# Patient Record
Sex: FEMALE | Race: WHITE | Employment: FULL TIME | URBAN - METROPOLITAN AREA
[De-identification: names, ages, dates, MRNs, and addresses within clinical notes are randomized per-mention and may not be internally consistent; named-entity substitution may affect disease eponyms.]

---

## 2017-10-25 ENCOUNTER — THERAPY VISIT (OUTPATIENT)
Dept: CHIROPRACTIC MEDICINE | Facility: CLINIC | Age: 62
End: 2017-10-25
Payer: COMMERCIAL

## 2017-10-25 DIAGNOSIS — M25.551 HIP PAIN, RIGHT: ICD-10-CM

## 2017-10-25 DIAGNOSIS — M99.05 SOMATIC DYSFUNCTION OF PELVIS REGION: Primary | ICD-10-CM

## 2017-10-25 DIAGNOSIS — M79.10 MYALGIA: ICD-10-CM

## 2017-10-25 DIAGNOSIS — M16.10 ARTHRITIS OF HIP: ICD-10-CM

## 2017-10-25 PROCEDURE — 98940 CHIROPRACT MANJ 1-2 REGIONS: CPT | Mod: AT | Performed by: CHIROPRACTOR

## 2017-10-25 PROCEDURE — 99212 OFFICE O/P EST SF 10 MIN: CPT | Mod: 25 | Performed by: CHIROPRACTOR

## 2017-10-25 PROCEDURE — 97110 THERAPEUTIC EXERCISES: CPT | Performed by: CHIROPRACTOR

## 2017-10-25 ASSESSMENT — ACTIVITIES OF DAILY LIVING (ADL)
GETTING_INTO_AND_OUT_OF_A_BATHTUB: NO DIFFICULTY AT ALL
GOING_UP_1_FLIGHT_OF_STAIRS: NO DIFFICULTY AT ALL
TWISTING/PIVOTING_ON_INVOLVED_LEG: MODERATE DIFFICULTY
WALKING_APPROXIMATELY_10_MINUTES: NO DIFFICULTY AT ALL
WALKING_DOWN_STEEP_HILLS: NO DIFFICULTY AT ALL
HOW_WOULD_YOU_RATE_YOUR_CURRENT_LEVEL_OF_FUNCTION_DURING_YOUR_USUAL_ACTIVITIES_OF_DAILY_LIVING_FROM_0_TO_100_WITH_100_BEING_YOUR_LEVEL_OF_FUNCTION_PRIOR_TO_YOUR_HIP_PROBLEM_AND_0_BEING_THE_INABILITY_TO_PERFORM_ANY_OF_YOUR_USUAL_DAILY_ACTIVITIES?: 100
STEPPING_UP_AND_DOWN_CURBS: NO DIFFICULTY AT ALL
GETTING_INTO_AND_OUT_OF_AN_AVERAGE_CAR: NO DIFFICULTY AT ALL
DEEP_SQUATTING: NO DIFFICULTY AT ALL
WALKING_15_MINUTES_OR_GREATER: NO DIFFICULTY AT ALL
GOING_DOWN_1_FLIGHT_OF_STAIRS: NO DIFFICULTY AT ALL
PUTTING_ON_SOCKS_AND_SHOES: NO DIFFICULTY AT ALL
LIGHT_TO_MODERATE_WORK: NO DIFFICULTY AT ALL
HOS_ADL_SCORE(%): 96.43
WALKING_INITIALLY: NO DIFFICULTY AT ALL
STANDING_FOR_15_MINUTES: NO DIFFICULTY AT ALL
WALKING_UP_STEEP_HILLS: NO DIFFICULTY AT ALL
SITTING_FOR_15_MINUTES: NO DIFFICULTY AT ALL
HOS_ADL_HIGHEST_POTENTIAL_SCORE: 56
HOS_ADL_COUNT: 14
HOS_ADL_ITEM_SCORE_TOTAL: 54

## 2017-10-25 NOTE — PROGRESS NOTES
Subjective:  Referral from Dr. Abigail MD  CC: sore right hip (from muscle tear)  Location: right anterior hip  Medications: Thyroid Medication and anti anxiety medication   Visit: 1 (re-exam)  Onset: chronic pain for years   Goal: Be able to start being active and walk with no pain  Pain: 3/10 on average, varies with activty  Previous: right hip pain in past responded well to care  Social: alert, oriented, and active. Works in education   Under Care: saw  Dr. Hayes a couple a weeks ago  Imaging: Dexa Scan, bone spur, arthritis      Objective:  Inspection:  No SDD  No Scars     Palpation:  No specific pain  Palpable soreness over R anterior hip   Myofascitis 2/4 noted over r hip ER, R psoas, B glute me      ROM:  Lumbar flexion   90/90, tightness noted over bilateral hamstring and left Ql  Lumbar extension  20/30, discomfort left lower back  Right Hip IR  20/45  Left Hip IR  27/45  Right Hip ER  30/60  Left  hip ER  37/60     MMT:  Left glute med 4/5 with posterior hip discomfort   Right glute med 5/5 with left posterior hip discomfort   Left TFL 5/5 with lateral hip discomfort   Right TFL 5/5 with no pain  Left piriformis 5/5 with no pain  Right piriformis 4/5 with no pain  Psoas 4/5 with pain on right     MET:  Left short leg     Squat:  Shift to right  Right knee genu valgum  Right foot flare  Arm drop on left     Lunge:  Right knee genu valgum  Right knee cross over      NAL:  Restricted sacrum on left     Assessment:  NAL with associated myofascitis and weakness  Lumbago  Hip pain     Plan:  Patient tolerated treatment well today  Treatment Time: 45 minutes  32745 manipulation 1-2 segments: MET, Hip LAD  54482 manipulation:   44586 Manual therapy: (ART, Graston, Strain Counter Strain, Fascial Manipulation, Cupping) performed over area of R psoas, R hip ER's, R adductors, LPS  88627 therapeutic exercise (20 minutes):   1. Hip ER stretching  2. TFL stretching  3. Reviewed level 1 bridges which was given to her  by previous PT  4. Lateral band walks  5. Reverse Lunge (psoas stretch)  6. Adductor self MFR  7. Side lying abduction with hip IR  8. figure 4 self release with foam roller  9. figure 4 glute activation  10. figure 4 bridges on stability ball  11. Monster walks  Today: reverse clams yellow band, bird dogs   Strapping: Hip inferior glides, lateral glide   Next visit: 1 week  Other: Today focused on right hip anterior capsule and hip extension.   Plan: 6 visits over 6 weeks, responded well to care in past.

## 2017-10-25 NOTE — PROGRESS NOTES
Subjective:  Referral from Dr. Abigail MD  CC: sore right hip (from muscle tear)  Location: right anterior hip  Medications: Thyroid Medication and anti anxiety medication   Visit: 1 (re-exam)  Onset: chronic pain for years   Goal: Be able to start being active and walk with no pain  Location: right hip   Pain: 3/10 on average, varies with activty  Previous: right hip pain in past responded well to care  Social: alert, oriented, and active. Works in education   Under Care: saw  Dr. Hayes a couple a weeks ago  Imaging: Dexa Scan, bone spur, arthritis      Objective:  Inspection:  No SDD  No Scars     Palpation:  No specific pain  Palpable soreness over left lateral and posterior hip  Myofascitis 2/4 noted over r hip ER, left hip IR, left glute med, right hamstring, left QL, left TFL , dorsal sacral ligament     ROM:  Lumbar flexion   90/90, tightness noted over bilateral hamstring and left Ql  Lumbar extension  20/30, discomfort left lower back  Right Hip IR  21/45  Left Hip IR  24/45  Right Hip ER  32/60  Left  hip ER  27/60     MMT:  Left glute med 4/5 with posterior hip discomfort   Right glute med 5/5 with left posterior hip discomfort   Left TFL 5/5 with lateral hip discomfort   Right TFL 5/5 with no pain  Left piriformis 4/5 with posterior hip discomfort  Right piriformis 5/5 with no pain     MET:  Left short leg     Squat:  Shift to right  Right knee genu valgum  Right foot flare  Arm drop on left     Lunge:  Right knee genu valgum  Right knee cross over      NAL:  Restricted sacrum on left     Assessment:  NAL with associated myofascitis and weakness  Lumbago  Hip pain     Plan:  Patient tolerated treatment well today  Treatment Time: 45 minutes  16469 manipulation 1-2 segments: MET, Hip LAD  68818 manipulation:   67605 Manual therapy: (ART, Graston, Strain Counter Strain, Fascial Manipulation, Cupping) performed over area of left glute med, left tfl, left adductors, left QL, right hip ER, dorsal sacral  ligament  66767 therapeutic exercise (30 minutes):   1. Hip ER stretching  2. TFL stretching  3. Reviewed level 1 bridges which was given to her by previous PT  4. Lateral band walks  5. Reverse Lunge (psoas stretch)  6. Adductor self MFR  7. Side lying abduction with hip IR  8. figure 4 self release with foam roller  9. figure 4 glute activation  10. figure 4 bridges on stability ball  Today: Monster Walks with band, TFL stretches (side bending)  Strapping: Hip ER on right  Next visit: 1 week  Other: Today focused on right hip anterior capsule and hip extension. She tolerated treatment well.  Plan: 6 visits over 6 weeks, responded well to care in past.

## 2017-11-03 ENCOUNTER — THERAPY VISIT (OUTPATIENT)
Dept: CHIROPRACTIC MEDICINE | Facility: CLINIC | Age: 62
End: 2017-11-03
Payer: COMMERCIAL

## 2017-11-03 DIAGNOSIS — M16.10 ARTHRITIS OF HIP: ICD-10-CM

## 2017-11-03 DIAGNOSIS — M79.10 MYALGIA: ICD-10-CM

## 2017-11-03 DIAGNOSIS — M25.551 HIP PAIN, RIGHT: ICD-10-CM

## 2017-11-03 DIAGNOSIS — M99.05 SOMATIC DYSFUNCTION OF PELVIS REGION: Primary | ICD-10-CM

## 2017-11-03 PROCEDURE — 98940 CHIROPRACT MANJ 1-2 REGIONS: CPT | Mod: AT | Performed by: CHIROPRACTOR

## 2017-11-03 PROCEDURE — 97110 THERAPEUTIC EXERCISES: CPT | Performed by: CHIROPRACTOR

## 2017-11-03 NOTE — MR AVS SNAPSHOT
After Visit Summary   11/3/2017    Cherri Ding    MRN: [id number]           Patient Information     Date Of Birth          1955        Visit Information        Provider Department      11/3/2017 4:00 PM Tanner Snider DC [facility] [place] Chirop[facility]        Today's Diagnoses     Somatic dysfunction of pelvis region    -  1    Hip pain, right        Myalgia        Arthritis of hip           Follow-ups after your visit        Your next 10 appointments already scheduled     Nov 20, 2017  4:00 PM CST   KG Chiropractor with Tanner Snider DC   [facility] [place] Chiropractor (KG [place])    [address]  [id number]  [place] [address] [phone]            Nov 27, 2017   Procedure with Maame St MD   [facility] ([facility])    [address] [place] [address] [phone]           [facility] is located at [address] [place]              Who to contact     If you have questions or need follow up information about today's clinic visit or your schedule please contact [facility] [place] [facility] directly at [phone].  Normal or non-critical lab and imaging results will be communicated to you by [employer], letter or phone within 4 business days after the clinic has received the results. If you do not hear from us within 7 days, please contact the clinic through MyChart or phone. If you have a critical or abnormal lab result, we will notify you by phone as soon as possible.  Submit refill requests through [employer] or call your pharmacy and they will forward the refill request to us. Please allow 3 business days for your refill to be completed.          Additional Information About Your Visit        [employer] Information     [employer] lets you send messages to your doctor, view your test results, renew your  "prescriptions, schedule appointments and more. To sign up, go to [email or web address] . Click on \"Log in\" on the left side of the screen, which will take you to the Welcome page. Then click on \"Sign up Now\" on the right side of the page.     You will be asked to enter the access code listed below, as well as some personal information. Please follow the directions to create your username and password.     Your access code is: [email or web address]  Expires: 2018  9:32 PM     Your access code will  in 90 days. If you need help or a new code, please call your [facility] clinic or [phone].        Care EveryWhere ID     This is your Care EveryWhere ID. This could be used by other organizations to access your [facility] medical records  [id number]         Blood Pressure from Last 3 Encounters:   12 130/59    Weight from Last 3 Encounters:   14 74.8 kg (165 lb)   12 74.8 kg (165 lb)              We Performed the Following     CHIROPRAC MANIP,SPINAL,1-2 REGIONS     THERAPEUTIC EXERCISES        Primary Care Provider Office Phone # Fax #    Elayne [facility] [phone]       [address] [address]        Equal Access to Services     [facility] LORETO : Hadii aad ku hadasho Soomaali, waaxda luqadaha, qaybta kaalmada adeegyada, waxay louin hayaan nona etienne . So [facility] [phone].    ATENCIÓN: Si habla español, tiene a palencia disposición servicios gratuitos de asistencia lingüística. Llame al [phone].    We comply with applicable federal civil rights laws and [place] laws. We do not discriminate on the basis of race, color, national origin, age, disability, sex, sexual orientation, or gender identity.            Thank you!     Thank you for choosing [facility]  for your care. Our goal is always to provide you with excellent care. Hearing back from our patients is one way we can continue to improve our " services. Please take a few minutes to complete the written survey that you may receive in the mail after your visit with us. Thank you!             Your Updated Medication List - Protect others around you: Learn how to safely use, store and throw away your medicines at www.disposemymeds.org.          This list is accurate as of: 11/3/17  9:32 PM.  Always use your most recent med list.                   Brand Name Dispense Instructions for use Diagnosis    LEVOTHROID PO      Take  by mouth.        LEXAPRO PO      Take  by mouth.

## 2017-11-04 NOTE — PROGRESS NOTES
Subjective:  Referral from Dr. Abigail MD  CC: sore right hip (from muscle tear)  Location: right anterior hip  Medications: Thyroid Medication and anti anxiety medication   Visit: 2  Onset: chronic pain for years   Goal: Be able to start being active and walk with no pain  Pain: 3/10 on average, varies with activty  Previous: right hip pain in past responded well to care  Social: alert, oriented, and active. Works in education   Under Care: saw  Dr. Hayes a couple a weeks ago  Imaging: Dexa Scan, bone spur, arthritis     Comes in today doing very well. Tolerated last session well and was pleased with progress. Still has some right anterior hip discomfort. She is working on active care program. Denies any new changes.      Objective:  Inspection:  No SDD  No Scars     Palpation:  No specific pain  Palpable soreness over R anterior hip   Myofascitis 2/4 noted over r hip ER, R psoas, B glute me      ROM:  Lumbar flexion   90/90, tightness noted over bilateral hamstring and left Ql  Lumbar extension  20/30, discomfort left lower back  Right Hip IR  20/45  Left Hip IR  27/45  Right Hip ER  30/60  Left  hip ER  37/60     MMT:  Left glute med 4/5 with posterior hip discomfort   Right glute med 5/5 with left posterior hip discomfort   Left TFL 5/5 with lateral hip discomfort   Right TFL 5/5 with no pain  Left piriformis 5/5 with no pain  Right piriformis 4/5 with no pain  Psoas 4/5 with pain on right     MET:  Left short leg     Squat:  Shift to right  Right knee genu valgum  Right foot flare  Arm drop on left     Lunge:  Right knee genu valgum  Right knee cross over      NAL:  Restricted sacrum on left     Assessment:  NAL with associated myofascitis and weakness  Lumbago  Hip pain     Plan:  Patient tolerated treatment well today  Treatment Time: 45 minutes  69875 manipulation 1-2 segments: MET, Hip LAD  40695 manipulation:   03503 Manual therapy: (ART, Graston, Strain Counter Strain, Fascial Manipulation, Cupping)  performed over area of R psoas, R hip ER's, R adductors, LPS  84101 therapeutic exercise (20 minutes):   1. Hip ER stretching  2. TFL stretching  3. Reviewed level 1 bridges which was given to her by previous PT  4. Lateral band walks  5. Reverse Lunge (psoas stretch)  6. Adductor self MFR  7. Side lying abduction with hip IR  8. figure 4 self release with foam roller  9. figure 4 glute activation  10. figure 4 bridges on stability ball  11. Monster walks  Today: reverse clams yellow band, bird dogs, side lying abduction, marching bridges   Strapping: Hip inferior glides, lateral glide   Next visit: 1 week  Other: Today focused on right hip anterior capsule and hip extension.   Plan: 6 visits over 6 weeks, responded well to care in past.

## 2017-11-13 ENCOUNTER — THERAPY VISIT (OUTPATIENT)
Dept: CHIROPRACTIC MEDICINE | Facility: CLINIC | Age: 62
End: 2017-11-13
Payer: COMMERCIAL

## 2017-11-13 DIAGNOSIS — M16.10 ARTHRITIS OF HIP: ICD-10-CM

## 2017-11-13 DIAGNOSIS — M25.551 HIP PAIN, RIGHT: ICD-10-CM

## 2017-11-13 DIAGNOSIS — M99.05 SOMATIC DYSFUNCTION OF PELVIS REGION: Primary | ICD-10-CM

## 2017-11-13 DIAGNOSIS — M79.10 MYALGIA: ICD-10-CM

## 2017-11-13 PROCEDURE — 97110 THERAPEUTIC EXERCISES: CPT | Performed by: CHIROPRACTOR

## 2017-11-13 PROCEDURE — 98940 CHIROPRACT MANJ 1-2 REGIONS: CPT | Mod: AT | Performed by: CHIROPRACTOR

## 2017-11-13 NOTE — MR AVS SNAPSHOT
After Visit Summary   11/13/2017    Cherri Ding    MRN: [id number]           Patient Information     Date Of Birth          1955        Visit Information        Provider Department      11/13/2017 4:45 PM Tanner Snider DC [facility] [place] Chiropractor        Today's Diagnoses     Somatic dysfunction of pelvis region    -  1    Hip pain, right        Myalgia        Arthritis of hip           Follow-ups after your visit        Your next 10 appointments already scheduled     Nov 20, 2017  4:00 PM CST   KG Chiropractor with Tanner Snider DC   [facility] [place] Chirop[facility] ([facility] [place])    [address] [place] [address]  [address]  [place] [address] [phone]            Nov 27, 2017   Procedure with Maame St MD   [facility] ([facility])    [address]  [facility] [id number] [phone]           [facility] is located at [address] [place]            Nov 28, 2017  3:15 PM CST   KG Chiropractor with Tanner Snider DC   [facility] [place] Chirop[facility] ([facility] [place])    [address] [place] [address]  [address]  [place] [id number] [phone]              Who to contact     If you have questions or need follow up information about today's clinic visit or your schedule please contact [facility] [place] [facility] directly at [phone].  Normal or non-critical lab and imaging results will be communicated to you by MyChart, letter or phone within 4 business days after the clinic has received the results. If you do not hear from us within 7 days, please contact the clinic through MyChart or phone. If you have a critical or abnormal lab result, we will notify you by phone as soon as possible.  Submit refill requests through [employer] or call your pharmacy and they will forward the refill request  "to us. Please allow 3 business days for your refill to be completed.          Additional Information About Your Visit        [employer] Information     [facility] lets you send messages to your doctor, view your test results, renew your prescriptions, schedule appointments and more. To sign up, go to [email or web address] . Click on \"Log in\" on the left side of the screen, which will take you to the Welcome page. Then click on \"Sign up Now\" on the right side of the page.     You will be asked to enter the access code listed below, as well as some personal information. Please follow the directions to create your username and password.     Your access code is: [email or web address]  Expires: 2018  8:32 PM     Your access code will  in 90 days. If you need help or a new code, please call your [facility] clinic or [phone].        Care EveryWhere ID     This is your Care EveryWhere ID. This could be used by other organizations to access your [facility] medical records  [id number]         Blood Pressure from Last 3 Encounters:   12 130/59    Weight from Last 3 Encounters:   14 74.8 kg (165 lb)   12 74.8 kg (165 lb)              We Performed the Following     CHIROPRAC MANIP,SPINAL,1-2 REGIONS     THERAPEUTIC EXERCISES        Primary Care Provider Office Phone # Fax #    Elayne Emmanuel [phone]       [address] [address]        Equal Access to Services     TIERNEY DANGELO : Hadii aad ku hadasho Soomaali, waaxda luqadaha, qaybta kaalmada adeegyada, genna etienne . So [facility] [phone].    ATENCIÓN: Si tatianala radha, tiene a palencia disposición servicios gratuitos de asistencia lingüística. Llame al [phone].    We comply with applicable federal civil rights laws and [place] laws. We do not discriminate on the basis of race, color, national origin, age, disability, sex, sexual orientation, or gender identity.            Thank you!     " Thank you for choosing [facility]  for your care. Our goal is always to provide you with excellent care. Hearing back from our patients is one way we can continue to improve our services. Please take a few minutes to complete the written survey that you may receive in the mail after your visit with us. Thank you!             Your Updated Medication List - Protect others around you: Learn how to safely use, store and throw away your medicines at www.disposemymeds.org.          This list is accurate as of: 11/13/17  8:05 PM.  Always use your most recent med list.                   Brand Name Dispense Instructions for use Diagnosis    LEVOTHROID PO      Take  by mouth.        LEXAPRO PO      Take  by mouth.

## 2017-11-14 NOTE — PROGRESS NOTES
Subjective:  Referral from Dr. 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 Hayes a couple a weeks ago  Imaging: Dexa Scan, bone spur, arthritis     Comes in today doing good. Notes that had tightness after run last week. Notes that has overall noticed an improvement and is pleased with progress. Denies any new issues.      Objective:  Inspection:  No SDD  No Scars     Palpation:  No specific pain  Palpable soreness over R anterior hip   Myofascitis 2/4 noted over r hip ER, R psoas, B glute me      ROM:  Lumbar flexion   90/90, tightness noted over bilateral hamstring and left Ql  Lumbar extension  20/30, discomfort left lower back  Right Hip IR  20/45  Left Hip IR  27/45  Right Hip ER  30/60  Left  hip ER  37/60     MMT:  Left glute med 4/5 with posterior hip discomfort   Right glute med 5/5 with left posterior hip discomfort   Left TFL 5/5 with lateral hip discomfort   Right TFL 5/5 with no pain  Left piriformis 5/5 with no pain  Right piriformis 4/5 with no pain  Psoas 4/5 with pain on right     MET:  Left short leg     Squat:  Shift to right  Right knee genu valgum  Right foot flare  Arm drop on left     Lunge:  Right knee genu valgum  Right knee cross over      NAL:  Restricted sacrum on left     Assessment:  NAL with associated myofascitis and weakness  Lumbago  Hip pain     Plan:  Patient tolerated treatment well today  Treatment Time: 45 minutes  62750 manipulation 1-2 segments: MET, Hip LAD  62926 manipulation:   47140 Manual therapy: (ART, Graston, Strain Counter Strain, Fascial Manipulation, Cupping) performed over area of R psoas,  R hip ER's, R adductors, LPS  41126 therapeutic exercise (20 minutes):   1. Hip ER stretching  2. TFL stretching  3. Reviewed level 1 bridges which was given to her by previous PT  4. Lateral band walks  5. Reverse Lunge (psoas stretch)  6. Adductor self MFR  7. Side lying abduction with hip IR  8. figure 4 self release with foam roller  9. figure 4 glute activation  10. figure 4 bridges on stability ball  11. Monster walks  Today: reverse clams yellow band, bird dogs, side lying abduction, single leg bridges   Strapping: Hip inferior glides, lateral glide   Next visit: 1 week  Other: Today focused on right hip anterior capsule and hip extension.   Plan: 6 visits over 6 weeks, responded well to care in past.

## 2017-11-25 RX ORDER — MULTIPLE VITAMINS W/ MINERALS TAB 9MG-400MCG
1 TAB ORAL DAILY
COMMUNITY

## 2017-11-27 ENCOUNTER — HOSPITAL ENCOUNTER (OUTPATIENT)
Facility: CLINIC | Age: 62
Discharge: HOME | End: 2017-11-27
Attending: COLON & RECTAL SURGERY | Admitting: COLON & RECTAL SURGERY
Payer: COMMERCIAL

## 2017-11-27 VITALS
HEIGHT: 70 IN | OXYGEN SATURATION: 98 % | RESPIRATION RATE: 16 BRPM | BODY MASS INDEX: 22.76 KG/M2 | WEIGHT: 159 LBS | DIASTOLIC BLOOD PRESSURE: 46 MMHG | SYSTOLIC BLOOD PRESSURE: 114 MMHG

## 2017-11-27 LAB — COLONOSCOPY: NORMAL

## 2017-11-27 PROCEDURE — 99153 MOD SED SAME PHYS/QHP EA: CPT

## 2017-11-27 PROCEDURE — G0500 MOD SEDAT ENDO SERVICE >5YRS: HCPCS | Performed by: COLON & RECTAL SURGERY

## 2017-11-27 PROCEDURE — 25000128 H RX IP 250 OP 636: Performed by: COLON & RECTAL SURGERY

## 2017-11-27 PROCEDURE — 88305 TISSUE EXAM BY PATHOLOGIST: CPT | Performed by: COLON & RECTAL SURGERY

## 2017-11-27 PROCEDURE — 45385 COLONOSCOPY W/LESION REMOVAL: CPT | Performed by: COLON & RECTAL SURGERY

## 2017-11-27 PROCEDURE — 88305 TISSUE EXAM BY PATHOLOGIST: CPT | Mod: 26 | Performed by: COLON & RECTAL SURGERY

## 2017-11-27 RX ORDER — FENTANYL CITRATE 50 UG/ML
INJECTION, SOLUTION INTRAMUSCULAR; INTRAVENOUS PRN
Status: DISCONTINUED | OUTPATIENT
Start: 2017-11-27 | End: 2017-11-27 | Stop reason: HOSPADM

## 2017-11-27 RX ORDER — ONDANSETRON 4 MG/1
4 TABLET, ORALLY DISINTEGRATING ORAL EVERY 6 HOURS PRN
Status: DISCONTINUED | OUTPATIENT
Start: 2017-11-27 | End: 2017-11-27 | Stop reason: HOSPADM

## 2017-11-27 RX ORDER — ONDANSETRON 2 MG/ML
4 INJECTION INTRAMUSCULAR; INTRAVENOUS
Status: DISCONTINUED | OUTPATIENT
Start: 2017-11-27 | End: 2017-11-27 | Stop reason: HOSPADM

## 2017-11-27 RX ORDER — FLUMAZENIL 0.1 MG/ML
0.2 INJECTION, SOLUTION INTRAVENOUS
Status: DISCONTINUED | OUTPATIENT
Start: 2017-11-27 | End: 2017-11-27 | Stop reason: HOSPADM

## 2017-11-27 RX ORDER — NALOXONE HYDROCHLORIDE 0.4 MG/ML
.1-.4 INJECTION, SOLUTION INTRAMUSCULAR; INTRAVENOUS; SUBCUTANEOUS
Status: DISCONTINUED | OUTPATIENT
Start: 2017-11-27 | End: 2017-11-27 | Stop reason: HOSPADM

## 2017-11-27 RX ORDER — ONDANSETRON 2 MG/ML
4 INJECTION INTRAMUSCULAR; INTRAVENOUS EVERY 6 HOURS PRN
Status: DISCONTINUED | OUTPATIENT
Start: 2017-11-27 | End: 2017-11-27 | Stop reason: HOSPADM

## 2017-11-27 RX ORDER — LIDOCAINE 40 MG/G
CREAM TOPICAL
Status: DISCONTINUED | OUTPATIENT
Start: 2017-11-27 | End: 2017-11-27 | Stop reason: HOSPADM

## 2017-11-27 NOTE — IP AVS SNAPSHOT
MRN:[id number]                      After Visit Summary   11/27/2017    Cherri Ding    MRN: [id number]           Thank you!     Thank you for choosing [facility] for your care. Our goal is always to provide you with excellent care. Hearing back from our patients is one way we can continue to improve our services. Please take a few minutes to complete the written survey that you may receive in the mail after you visit. If you would like to speak to someone directly about your visit please contact Patient Relations at [phone]. Thank you!          Patient Information     Date Of Birth          1955        About your hospital stay     You were admitted on:  November 27, 2017 You last received care in the:  [facility] Endoscopy    You were discharged on:  November 27, 2017       Who to Call     For medical emergencies, please call 911.  For non-urgent questions about your medical care, please call your primary care provider or clinic, [phone]  For questions related to your surgery, please call your surgery clinic        Attending Provider     Provider Specialty    Maame St MD Colon and Rectal Surgery       Primary Care Provider Office Phone # Fax #    Elayne Emmanuel [phone]      Your next 10 appointments already scheduled     Nov 28, 2017  3:15 PM CST   [facility] Chiropractor with Tanner Snider DC   [facility] [place] [facility] ([facility] [place])    [address] [place] [address]  [address]  [place] [address] [phone]              Further instructions from your care team         Understanding Colon and Rectal Polyps     The colon has a smooth lining composed of millions of cells.     The colon (also called the large intestine) is a muscular tube that forms the last part of the digestive tract. It absorbs water and stores food waste. The colon is about 4 to 6 feet long. The rectum is the last 6 inches of the colon.  "The colon and rectum have a smooth lining composed of millions of cells. Changes in these cells can lead to growths in the colon that can become cancerous and should be removed.     When the Colon Lining Changes  Changes that occur in the cells that line the colon or rectum can lead to growths called polyps. Over a period of years, polyps can turn cancerous. Removing polyps early may prevent cancer from ever forming.      Polyps  Polyps are fleshy clumps of tissue that form on the lining of the colon or rectum. Small polyps are usually benign (not cancerous). However, over time, cells in a polyp can change and become cancerous. The larger a polyp grows, the more likely this is to happen. Also, certain types of polyps known as adenomatous polyps are considered premalignant. This means that they will almost always become cancerous if they re not removed.          Cancer  Almost all colorectal cancers start when polyp cells begin growing abnormally. As a cancerous tumor grows, it may involve more and more of the colon or rectum. In time, cancer can also grow beyond the colon or rectum and spread to nearby organs or to glands called lymph nodes. The cells can also travel to other parts of the body. This is known as metastasis. The earlier a cancerous tumor is removed, the better the chance of preventing its spread.        3187-8381 [facility], [address] [place] [address], [place] [address]. All rights reserved. This information is not intended as a substitute for professional medical care. Always follow your healthcare professional's instructions.    Pending Results     No orders found from 11/25/2017 to 11/28/2017.            Admission Information     Date & Time Provider Department Dept. Phone    11/27/2017 Maame St MD [facility] Endoscopy [phone]      Your Vitals Were     Blood Pressure Respirations Height Weight Pulse Oximetry BMI (Body Mass Index)    118/62 8 1.778 m (5' 10\") 72.1 kg (159 lb) " "97% 22.81 kg/m2      ResiModel Information     ResiModel lets you send messages to your doctor, view your test results, renew your prescriptions, schedule appointments and more. To sign up, go to [email or web address] . Click on \"Log in\" on the left side of the screen, which will take you to the Welcome page. Then click on \"Sign up Now\" on the right side of the page.     You will be asked to enter the access code listed below, as well as some personal information. Please follow the directions to create your username and password.     Your access code is: [email or web address]  Expires: 2018  8:32 PM     Your access code will  in 90 days. If you need help or a new code, please call your [place] clinic or [phone].        Care EveryWhere ID     This is your Care EveryWhere ID. This could be used by other organizations to access your [place] medical records  [id number]        Equal Access to Services     DHAVAL DANGELO : Hadii josi pearl hadasho Sofiliali, waaxda luqadaha, qaybta kaalmada adeegyada, genna etienne . So [facility] [phone].    ATENCIÓN: Si habla español, tiene a palencia disposición servicios gratuitos de asistencia lingüística. Eliel al [phone].    We comply with applicable federal civil rights laws and [place] laws. We do not discriminate on the basis of race, color, national origin, age, disability, sex, sexual orientation, or gender identity.               Review of your medicines      CONTINUE these medicines which have NOT CHANGED        Dose / Directions    LEVOTHROID PO        Take  by mouth.   Refills:  0       LEXAPRO PO        Take  by mouth.   Refills:  0       Multi-vitamin Tabs tablet        Dose:  1 tablet   Take 1 tablet by mouth daily   Refills:  0       VITAMIN D (CHOLECALCIFEROL) PO        Dose:  2000 Units   Take 2,000 Units by mouth daily   Refills:  0                Protect others around you: Learn how to safely use, store and throw away your medicines at " www.disposemymeds.org.             Medication List: This is a list of all your medications and when to take them. Check marks below indicate your daily home schedule. Keep this list as a reference.      Medications           Morning Afternoon Evening Bedtime As Needed    LEVOTHROID PO   Take  by mouth.                                LEXAPRO PO   Take  by mouth.                                Multi-vitamin Tabs tablet   Take 1 tablet by mouth daily                                VITAMIN D (CHOLECALCIFEROL) PO   Take 2,000 Units by mouth daily

## 2017-11-27 NOTE — H&P
Pre-Endoscopy History and Physical     Cherri Ding MRN# [id number]   YOB: 1955 Age: 62 year old     Date of Procedure: 11/27/2017  Primary care provider: Elayne Emmanuel  Type of Endoscopy: colonoscopy  Reason for Procedure: screening  Type of Anesthesia Anticipated: Moderate Sedation    HPI:    Cherri is a 62 year old female who will be undergoing the above procedure.      A history and physical has been performed. The patient's medications and allergies have been reviewed. The risks and benefits of the procedure and the sedation options and risks were discussed with the patient.  All questions were answered and informed consent was obtained.      She denies a personal or family history of anesthesia complications or bleeding disorders.     Allergies   Allergen Reactions     Aspirin Hives     Penicillin G Hives     Vicodin [Hydrocodone-Acetaminophen] Hives        Prior to Admission Medications   Prescriptions Last Dose Informant Patient Reported? Taking?   Escitalopram Oxalate (LEXAPRO PO) 11/25/2017 at Unknown time  Yes Yes   Sig: Take  by mouth.   Levothyroxine Sodium (LEVOTHROID PO) 11/25/2017 at Unknown time  Yes Yes   Sig: Take  by mouth.   VITAMIN D, CHOLECALCIFEROL, PO 11/25/2017 at Unknown time  Yes Yes   Sig: Take 2,000 Units by mouth daily   multivitamin, therapeutic with minerals (MULTI-VITAMIN) TABS tablet 11/24/2017 at Unknown time  Yes Yes   Sig: Take 1 tablet by mouth daily      Facility-Administered Medications: None       Patient Active Problem List   Diagnosis     Hip pain, right     Somatic dysfunction of pelvis region     Myalgia     Arthritis of hip        Past Medical History:   Diagnosis Date     Thyroid disease         Past Surgical History:   Procedure Laterality Date     APPENDECTOMY      3rd grade     COLONOSCOPY  12/18/2012    Procedure: COLONOSCOPY;  COLONOSCOPY ;  Surgeon: Yovani Mccullough MD;  Location:  GI     COLONOSCOPY  11/27/2017    Dr. [facility]     HEAD & NECK  "SURGERY      wisdom teeth removed     ORTHOPEDIC SURGERY      shoulder right       Social History   Substance Use Topics     Smoking status: Never Smoker     Smokeless tobacco: Never Used     Alcohol use Yes      Comment: 5x week a glass of wine       Family History   Problem Relation Age of Onset     Colon Cancer Maternal Aunt        REVIEW OF SYSTEMS:     5 point ROS negative except as noted above in HPI, including Gen., Resp., CV, GI &  system review.      PHYSICAL EXAM:   Ht 1.778 m (5' 10\")  Wt 72.1 kg (159 lb)  BMI 22.81 kg/m2 Estimated body mass index is 22.81 kg/(m^2) as calculated from the following:    Height as of this encounter: 1.778 m (5' 10\").    Weight as of this encounter: 72.1 kg (159 lb).   GENERAL APPEARANCE: healthy and alert  MENTAL STATUS: alert  AIRWAY EXAM: Mallampatti Class II (visualization of the soft palate, fauces, and uvula)  RESP: lungs clear to auscultation - no rales, rhonchi or wheezes  CV: regular rates and rhythm      DIAGNOSTICS:    Not indicated      IMPRESSION   ASA Class 2 - Mild systemic disease        PLAN:       Plan for colonoscopy. We discussed the risks, benefits and alternatives and the patient wished to proceed.    The above has been forwarded to the consulting provider.      Signed Electronically by: Maame St MD  November 27, 2017    "

## 2017-11-27 NOTE — DISCHARGE INSTRUCTIONS
Understanding Colon and Rectal Polyps     The colon has a smooth lining composed of millions of cells.     The colon (also called the large intestine) is a muscular tube that forms the last part of the digestive tract. It absorbs water and stores food waste. The colon is about 4 to 6 feet long. The rectum is the last 6 inches of the colon. The colon and rectum have a smooth lining composed of millions of cells. Changes in these cells can lead to growths in the colon that can become cancerous and should be removed.     When the Colon Lining Changes  Changes that occur in the cells that line the colon or rectum can lead to growths called polyps. Over a period of years, polyps can turn cancerous. Removing polyps early may prevent cancer from ever forming.      Polyps  Polyps are fleshy clumps of tissue that form on the lining of the colon or rectum. Small polyps are usually benign (not cancerous). However, over time, cells in a polyp can change and become cancerous. The larger a polyp grows, the more likely this is to happen. Also, certain types of polyps known as adenomatous polyps are considered premalignant. This means that they will almost always become cancerous if they re not removed.          Cancer  Almost all colorectal cancers start when polyp cells begin growing abnormally. As a cancerous tumor grows, it may involve more and more of the colon or rectum. In time, cancer can also grow beyond the colon or rectum and spread to nearby organs or to glands called lymph nodes. The cells can also travel to other parts of the body. This is known as metastasis. The earlier a cancerous tumor is removed, the better the chance of preventing its spread.        6914-9456 [facility], [address] [place] [address], [place] [address]. All rights reserved. This information is not intended as a substitute for professional medical care. Always follow your healthcare professional's instructions.

## 2017-11-27 NOTE — OP NOTE
See Provation Note In Chart    Maame St MD  [facility]  Office Phone # [phone]

## 2017-11-28 ENCOUNTER — THERAPY VISIT (OUTPATIENT)
Dept: CHIROPRACTIC MEDICINE | Facility: CLINIC | Age: 62
End: 2017-11-28
Payer: COMMERCIAL

## 2017-11-28 DIAGNOSIS — M16.10 ARTHRITIS OF HIP: ICD-10-CM

## 2017-11-28 DIAGNOSIS — M99.05 SOMATIC DYSFUNCTION OF PELVIS REGION: Primary | ICD-10-CM

## 2017-11-28 DIAGNOSIS — M79.10 MYALGIA: ICD-10-CM

## 2017-11-28 DIAGNOSIS — M25.551 HIP PAIN, RIGHT: ICD-10-CM

## 2017-11-28 LAB — COPATH REPORT: NORMAL

## 2017-11-28 PROCEDURE — 97110 THERAPEUTIC EXERCISES: CPT | Performed by: CHIROPRACTOR

## 2017-11-28 PROCEDURE — 98940 CHIROPRACT MANJ 1-2 REGIONS: CPT | Mod: AT | Performed by: CHIROPRACTOR

## 2017-11-28 NOTE — MR AVS SNAPSHOT
"              After Visit Summary   11/28/2017    Cherri Ding    MRN: [id number]           Patient Information     Date Of Birth          1955        Visit Information        Provider Department      11/28/2017 3:15 PM Tanner Snider DC [facility] [place] Chiropractor        Today's Diagnoses     Somatic dysfunction of pelvis region    -  1    Hip pain, right        Myalgia        Arthritis of hip           Follow-ups after your visit        Your next 10 appointments already scheduled     Dec 12, 2017  3:15 PM [facility] Chiropractor with Tanner Snider DC   [facility] [place] Chiropractor ([facility] [place])    [address] [place] [address]  [address]  [place] [address] [phone]              Who to contact     If you have questions or need follow up information about today's clinic visit or your schedule please contact [facility] [place] CHIROPRACTOR directly at [phone].  Normal or non-critical lab and imaging results will be communicated to you by divorce360hart, letter or phone within 4 business days after the clinic has received the results. If you do not hear from us within 7 days, please contact the clinic through Xactly Corpt or phone. If you have a critical or abnormal lab result, we will notify you by phone as soon as possible.  Submit refill requests through Suite[employer] or call your pharmacy and they will forward the refill request to us. Please allow 3 business days for your refill to be completed.          Additional Information About Your Visit        divorce360harCambiatta Information     Suite101 lets you send messages to your doctor, view your test results, renew your prescriptions, schedule appointments and more. To sign up, go to [email or web address] . Click on \"Log in\" on the left side of the screen, which will take you to the Welcome page. Then click on \"Sign up Now\" on the right side of the page.     You will be asked to " enter the access code listed below, as well as some personal information. Please follow the directions to create your username and password.     Your access code is: [email or web address]  Expires: 2018  8:32 PM     Your access code will  in 90 days. If you need help or a new code, please call your [place] clinic or [phone].        Care EveryWhere ID     This is your Care EveryWhere ID. This could be used by other organizations to access your [place] medical records  [id number]         Blood Pressure from Last 3 Encounters:   17 114/46   12 130/59    Weight from Last 3 Encounters:   17 72.1 kg (159 lb)   14 74.8 kg (165 lb)   12 74.8 kg (165 lb)              We Performed the Following     CHIROPRAC MANIP,SPINAL,1-2 REGIONS     THERAPEUTIC EXERCISES        Primary Care Provider Office Phone # Fax #    Elayne Emmanuel [phone]       [address] [address]        Equal Access to Services     [facility]: Hadii aad ku hadasho Soomaali, waaxda luqadaha, qaybta kaalmada adelyle, genna etienne . So [facility] [phone].    ATENCIÓN: Si habla español, tiene a palencia disposición servicios gratuitos de asistencia lingüística. Eliel al [phone].    We comply with applicable federal civil rights laws and [place] laws. We do not discriminate on the basis of race, color, national origin, age, disability, sex, sexual orientation, or gender identity.            Thank you!     Thank you for choosing [facility]  [place] [place] [facility]  for your care. Our goal is always to provide you with excellent care. Hearing back from our patients is one way we can continue to improve our services. Please take a few minutes to complete the written survey that you may receive in the mail after your visit with us. Thank you!             Your Updated Medication List - Protect others around you: Learn how to  safely use, store and throw away your medicines at www.disposemymeds.org.          This list is accurate as of: 11/28/17 11:59 PM.  Always use your most recent med list.                   Brand Name Dispense Instructions for use Diagnosis    LEVOTHROID PO      Take  by mouth.        LEXAPRO PO      Take  by mouth.        Multi-vitamin Tabs tablet      Take 1 tablet by mouth daily        VITAMIN D (CHOLECALCIFEROL) PO      Take 2,000 Units by mouth daily

## 2017-11-29 NOTE — PROGRESS NOTES
Subjective:  Referral from Dr. Abigail MD  CC: sore right hip (from muscle tear)  Location: right anterior hip  Medications: Thyroid Medication and anti anxiety medication   Visit: 4  Onset: chronic pain for years   Goal: Be able to start being active and walk with no pain  Pain: 3/10 on average, varies with activty  Previous: right hip pain in past responded well to care  Social: alert, oriented, and active. Works in education   Under Care: saw  Dr. Hayes a couple a weeks ago  Imaging: Dexa Scan, bone spur, arthritis     Comes in today doing good. Notes that had tightness after run yesterday, but notes it is much better. Notes that has overall noticed an improvement and is pleased with progress. Denies any new issues. Would like to go over exercises again to make sure she is doing them correctly.      Objective:  Inspection:  No SDD  No Scars     Palpation:  No specific pain  Palpable soreness over R anterior hip   Myofascitis 2/4 noted over r hip ER, R psoas, B glute me      ROM:  Lumbar flexion   90/90, tightness noted over bilateral hamstring and left Ql  Lumbar extension  20/30, discomfort left lower back  Right Hip IR  20/45  Left Hip IR  27/45  Right Hip ER  30/60  Left  hip ER  37/60     MMT:  Left glute med 4/5 with posterior hip discomfort   Right glute med 5/5 with left posterior hip discomfort   Left TFL 5/5 with lateral hip discomfort   Right TFL 5/5 with no pain  Left piriformis 5/5 with no pain  Right piriformis 4/5 with no pain  Psoas 4/5 with pain on right     MET:  Left short leg     Squat:  Shift to right  Right knee genu valgum  Right foot flare  Arm drop on left     Lunge:  Right knee genu valgum  Right knee cross over      NAL:  Restricted sacrum on left     Assessment:  NAL with associated myofascitis and weakness  Lumbago  Hip pain     Plan:  Patient tolerated treatment well today  Treatment Time: 45 minutes  42184 manipulation 1-2 segments: MET, Hip LAD  89628 manipulation:   62788 Manual  therapy: (ART, Graston, Strain Counter Strain, Fascial Manipulation, Cupping) performed over area of R psoas, R hip ER's, R adductors, LPS  89452 therapeutic exercise (20 minutes):   1. Hip ER stretching  2. TFL stretching  3. Reviewed level 1 bridges which was given to her by previous PT  4. Lateral band walks  5. Reverse Lunge (psoas stretch)  6. Adductor self MFR  7. Side lying abduction with hip IR  8. figure 4 self release with foam roller  9. figure 4 glute activation  10. figure 4 bridges on stability ball  11. Monster walks  12.  reverse clams yellow band, bird dogs, side lying abduction, single leg bridges   Today: monster walks forward and backward, side plank hip thrust with clam, side lying hip abduction   Tried to find other exercises that focused on stretching but patient notes that back seemed to hurt doing some of them so did not have her do any more at home  Strapping: Hip inferior glides, lateral glide   Next visit: 2 week    Plan: 6 visits over 6 weeks, responded well to care in past.

## 2017-12-12 ENCOUNTER — THERAPY VISIT (OUTPATIENT)
Dept: CHIROPRACTIC MEDICINE | Facility: CLINIC | Age: 62
End: 2017-12-12
Payer: COMMERCIAL

## 2017-12-12 DIAGNOSIS — M25.551 HIP PAIN, RIGHT: ICD-10-CM

## 2017-12-12 DIAGNOSIS — M99.05 SOMATIC DYSFUNCTION OF PELVIS REGION: Primary | ICD-10-CM

## 2017-12-12 DIAGNOSIS — M16.10 ARTHRITIS OF HIP: ICD-10-CM

## 2017-12-12 DIAGNOSIS — M79.10 MYALGIA: ICD-10-CM

## 2017-12-12 PROCEDURE — 97110 THERAPEUTIC EXERCISES: CPT | Performed by: CHIROPRACTOR

## 2017-12-12 PROCEDURE — 98940 CHIROPRACT MANJ 1-2 REGIONS: CPT | Mod: AT | Performed by: CHIROPRACTOR

## 2017-12-12 NOTE — MR AVS SNAPSHOT
"              After Visit Summary   2017    Cherri Ding    MRN: [id number]           Patient Information     Date Of Birth          1955        Visit Information        Provider Department      2017 3:15 PM Tanner Snider DC [facility] [place] [facility]        Today's Diagnoses     Somatic dysfunction of pelvis region    -  1    Hip pain, right        Myalgia        Arthritis of hip           Follow-ups after your visit        Who to contact     If you have questions or need follow up information about today's clinic visit or your schedule please contact [place] [facility] directly at [phone].  Normal or non-critical lab and imaging results will be communicated to you by MyChart, letter or phone within 4 business days after the clinic has received the results. If you do not hear from us within 7 days, please contact the clinic through SquareKeyhart or phone. If you have a critical or abnormal lab result, we will notify you by phone as soon as possible.  Submit refill requests through [employer] or call your pharmacy and they will forward the refill request to us. Please allow 3 business days for your refill to be completed.          Additional Information About Your Visit        MyChart Information     [employer] lets you send messages to your doctor, view your test results, renew your prescriptions, schedule appointments and more. To sign up, go to [email or web address] . Click on \"Log in\" on the left side of the screen, which will take you to the Welcome page. Then click on \"Sign up Now\" on the right side of the page.     You will be asked to enter the access code listed below, as well as some personal information. Please follow the directions to create your username and password.     Your access code is: [email or web address]  Expires: 2018  8:32 PM     Your access code will  in 90 days. If you need help or a new code, please " call your [place] clinic or [phone].        Care EveryWhere ID     This is your Care EveryWhere ID. This could be used by other organizations to access your [place] medical records  [id number]         Blood Pressure from Last 3 Encounters:   11/27/17 114/46   12/18/12 130/59    Weight from Last 3 Encounters:   11/27/17 72.1 kg (159 lb)   08/22/14 74.8 kg (165 lb)   12/18/12 74.8 kg (165 lb)              We Performed the Following     CHIROPRAC MANIP,SPINAL,1-2 REGIONS     THERAPEUTIC EXERCISES        Primary Care Provider Office Phone # Fax #    Elayne [facility] [phone]       [address] [address]        Equal Access to Services     DHAVAL DANGELO : Hadii aad ku hadasho Soomaali, waaxda luqadaha, qaybta kaalmada adeegyada, waxay juan j hayyovanny etienne . So [facility] [phone].    ATENCIÓN: Si habla español, tiene a palencia disposición servicios gratuitos de asistencia lingüística. Llame al [phone].    We comply with applicable federal civil rights laws and [place] laws. We do not discriminate on the basis of race, color, national origin, age, disability, sex, sexual orientation, or gender identity.            Thank you!     Thank you for choosing [place] [facility]  for your care. Our goal is always to provide you with excellent care. Hearing back from our patients is one way we can continue to improve our services. Please take a few minutes to complete the written survey that you may receive in the mail after your visit with us. Thank you!             Your Updated Medication List - Protect others around you: Learn how to safely use, store and throw away your medicines at www.disposemymeds.org.          This list is accurate as of: 12/12/17 11:59 PM.  Always use your most recent med list.                   Brand Name Dispense Instructions for use Diagnosis    LEVOTHROID PO      Take  by mouth.        LEXAPRO PO       Take  by mouth.        Multi-vitamin Tabs tablet      Take 1 tablet by mouth daily        VITAMIN D (CHOLECALCIFEROL) PO      Take 2,000 Units by mouth daily

## 2017-12-13 NOTE — PROGRESS NOTES
Subjective:  Referral from Dr. Abigail MD  CC: sore right hip (from muscle tear)  Location: right anterior hip  Medications: Thyroid Medication and anti anxiety medication   Visit: 5  Onset: chronic pain for years   Goal: Be able to start being active and walk with no pain  Pain: 3/10 on average, varies with activty  Previous: right hip pain in past responded well to care  Social: alert, oriented, and active. Works in education   Under Care: saw  Dr. Hayes a couple a weeks ago  Imaging: Dexa Scan, bone spur, arthritis     Comes in today doing good. She is working on active care program. Notes she is making improvements. She is walking and running. Notes that sitting makes her hip worse. Most pain over right anterior hip.  She is able to to active care without creating pain in any other location.       Objective:  Inspection:  No SDD  No Scars     Palpation:  No specific pain  Palpable soreness over R anterior hip   Myofascitis 2/4 noted over r hip ER, R psoas, B glute me      ROM:  Lumbar flexion   90/90, tightness noted over bilateral hamstring and left Ql  Lumbar extension  20/30, discomfort left lower back  Right Hip IR  20/45  Left Hip IR  27/45  Right Hip ER  30/60  Left  hip ER  37/60     MMT:  Left glute med 4/5 with posterior hip discomfort   Right glute med 5/5 with left posterior hip discomfort   Left TFL 5/5 with lateral hip discomfort   Right TFL 5/5 with no pain  Left piriformis 5/5 with no pain  Right piriformis 4/5 with no pain  Psoas 4/5 with pain on right     MET:  Left short leg     Squat:  Shift to right  Right knee genu valgum  Right foot flare  Arm drop on left     Lunge:  Right knee genu valgum  Right knee cross over      NAL:  Restricted sacrum on left     Assessment:  NAL with associated myofascitis and weakness  Lumbago  Hip pain     Plan:  Patient tolerated treatment well today  Treatment Time: 45 minutes  25710 manipulation 1-2 segments: MET, Hip LAD  82628 manipulation:   14325 Manual  therapy: (ART, Graston, Strain Counter Strain, Fascial Manipulation, Cupping) performed over area of R psoas, R hip ER's, R adductors, LPS  27277 therapeutic exercise (20 minutes):   1. Hip ER stretching  2. TFL stretching  3. Reviewed level 1 bridges which was given to her by previous PT  4. Lateral band walks  5. Reverse Lunge (psoas stretch)  6. Adductor self MFR  7. Side lying abduction with hip IR  8. figure 4 self release with foam roller  9. figure 4 glute activation  10. figure 4 bridges on stability ball  11. Monster walks  12.  reverse clams yellow band, bird dogs, side lying abduction, single leg bridges   Today: monster walks forward and backward, side plank hip thrust with clam, side lying hip abduction   Tried to find other exercises that focused on stretching but patient notes that back seemed to hurt doing some of them so did not have her do any more at home  Strapping: Hip inferior glides, lateral glide   Next visit: 2 week, discussed dry needling or next visit

## 2019-08-25 ENCOUNTER — NURSE TRIAGE (OUTPATIENT)
Dept: NURSING | Facility: CLINIC | Age: 64
End: 2019-08-25

## 2019-08-25 NOTE — TELEPHONE ENCOUNTER
"\"I was getting off of a ladder and fell and twisted my knee. I didn't hear anything pop and it's not swollen it just hurts. I can walk on in but I'm limping.\" Denies other sx. Triaged, gave home care advice and to be sen within 2-3 days.  Jacquelin Mendoza RN [facility]        Reason for Disposition    [1] Limp when walking AND [2] due to a twisted knee    Additional Information    Negative: [1] SEVERE pain AND [2] not improved 2 hours after pain medicine/ice packs    Negative: Suspicious history for the injury    Negative: [1] High-risk adult (e.g., age > 60, osteoporosis, chronic steroid use) AND [2] limping    Negative: A \"snap\" or \"pop\" was heard at the time of injury    Negative: Large swelling or bruise (> 2 inches or 5 cm)    Protocols used: KNEE INJURY-A-AH      "

## 2020-06-24 ENCOUNTER — VIRTUAL VISIT (OUTPATIENT)
Dept: NEUROPSYCHOLOGY | Facility: CLINIC | Age: 65
End: 2020-06-24
Payer: COMMERCIAL

## 2020-06-24 DIAGNOSIS — G31.84 MCI (MILD COGNITIVE IMPAIRMENT) WITH MEMORY LOSS: Primary | ICD-10-CM

## 2020-06-24 NOTE — NURSING NOTE
Pt was seen for neuropsychological evaluation at the request of Dr. Tarik Farris for the purposes of diagnostic clarification and treatment planning. 179 minutes of video test administration and scoring were provided by this writer. Please see Dr. Timmy Sutherland's report for a full interpretation of the findings.    Video Start:1:00PM  Video Stop: 2:58PM    Andrade Humphries  Psychometrist

## 2020-06-29 ENCOUNTER — HOSPITAL ENCOUNTER (EMERGENCY)
Facility: CLINIC | Age: 65
Discharge: HOME | End: 2020-06-29
Attending: EMERGENCY MEDICINE | Admitting: EMERGENCY MEDICINE
Payer: COMMERCIAL

## 2020-06-29 ENCOUNTER — APPOINTMENT (OUTPATIENT)
Dept: ULTRASOUND IMAGING | Facility: CLINIC | Age: 65
End: 2020-06-29
Attending: EMERGENCY MEDICINE
Payer: COMMERCIAL

## 2020-06-29 VITALS
HEIGHT: 70 IN | RESPIRATION RATE: 16 BRPM | TEMPERATURE: 96.9 F | DIASTOLIC BLOOD PRESSURE: 49 MMHG | BODY MASS INDEX: 23.34 KG/M2 | OXYGEN SATURATION: 99 % | WEIGHT: 163 LBS | SYSTOLIC BLOOD PRESSURE: 145 MMHG

## 2020-06-29 DIAGNOSIS — K80.50 BILIARY COLIC: ICD-10-CM

## 2020-06-29 LAB
ALBUMIN SERPL-MCNC: 3.5 G/DL (ref 3.4–5)
ALP SERPL-CCNC: 88 U/L (ref 40–150)
ALT SERPL W P-5'-P-CCNC: 31 U/L (ref 0–50)
ANION GAP SERPL CALCULATED.3IONS-SCNC: 6 MMOL/L (ref 3–14)
AST SERPL W P-5'-P-CCNC: 28 U/L (ref 0–45)
BASOPHILS # BLD AUTO: 0.1 10E9/L (ref 0–0.2)
BASOPHILS NFR BLD AUTO: 1.8 %
BILIRUB SERPL-MCNC: 0.8 MG/DL (ref 0.2–1.3)
BUN SERPL-MCNC: 17 MG/DL (ref 7–30)
CALCIUM SERPL-MCNC: 8.5 MG/DL (ref 8.5–10.1)
CHLORIDE SERPL-SCNC: 107 MMOL/L (ref 94–109)
CO2 SERPL-SCNC: 26 MMOL/L (ref 20–32)
CREAT SERPL-MCNC: 0.74 MG/DL (ref 0.52–1.04)
DIFFERENTIAL METHOD BLD: NORMAL
EOSINOPHIL # BLD AUTO: 0.6 10E9/L (ref 0–0.7)
EOSINOPHIL NFR BLD AUTO: 9.2 %
ERYTHROCYTE [DISTWIDTH] IN BLOOD BY AUTOMATED COUNT: 13.5 % (ref 10–15)
GFR SERPL CREATININE-BSD FRML MDRD: 85 ML/MIN/{1.73_M2}
GLUCOSE SERPL-MCNC: 96 MG/DL (ref 70–99)
HCT VFR BLD AUTO: 43.1 % (ref 35–47)
HGB BLD-MCNC: 14.2 G/DL (ref 11.7–15.7)
IMM GRANULOCYTES # BLD: 0 10E9/L (ref 0–0.4)
IMM GRANULOCYTES NFR BLD: 0.2 %
LIPASE SERPL-CCNC: 157 U/L (ref 73–393)
LYMPHOCYTES # BLD AUTO: 1.7 10E9/L (ref 0.8–5.3)
LYMPHOCYTES NFR BLD AUTO: 27.6 %
MCH RBC QN AUTO: 32 PG (ref 26.5–33)
MCHC RBC AUTO-ENTMCNC: 32.9 G/DL (ref 31.5–36.5)
MCV RBC AUTO: 97 FL (ref 78–100)
MONOCYTES # BLD AUTO: 0.7 10E9/L (ref 0–1.3)
MONOCYTES NFR BLD AUTO: 12.4 %
NEUTROPHILS # BLD AUTO: 2.9 10E9/L (ref 1.6–8.3)
NEUTROPHILS NFR BLD AUTO: 48.8 %
NRBC # BLD AUTO: 0 10*3/UL
NRBC BLD AUTO-RTO: 0 /100
PLATELET # BLD AUTO: 234 10E9/L (ref 150–450)
POTASSIUM SERPL-SCNC: 3.8 MMOL/L (ref 3.4–5.3)
PROT SERPL-MCNC: 7.6 G/DL (ref 6.8–8.8)
RBC # BLD AUTO: 4.44 10E12/L (ref 3.8–5.2)
SODIUM SERPL-SCNC: 139 MMOL/L (ref 133–144)
WBC # BLD AUTO: 6 10E9/L (ref 4–11)

## 2020-06-29 PROCEDURE — 80053 COMPREHEN METABOLIC PANEL: CPT | Performed by: EMERGENCY MEDICINE

## 2020-06-29 PROCEDURE — 25800030 ZZH RX IP 258 OP 636: Performed by: EMERGENCY MEDICINE

## 2020-06-29 PROCEDURE — 83690 ASSAY OF LIPASE: CPT | Performed by: EMERGENCY MEDICINE

## 2020-06-29 PROCEDURE — 99285 EMERGENCY DEPT VISIT HI MDM: CPT | Mod: 25

## 2020-06-29 PROCEDURE — 76705 ECHO EXAM OF ABDOMEN: CPT

## 2020-06-29 PROCEDURE — 85025 COMPLETE CBC W/AUTO DIFF WBC: CPT | Performed by: EMERGENCY MEDICINE

## 2020-06-29 RX ORDER — ONDANSETRON 2 MG/ML
4 INJECTION INTRAMUSCULAR; INTRAVENOUS EVERY 30 MIN PRN
Status: DISCONTINUED | OUTPATIENT
Start: 2020-06-29 | End: 2020-06-29 | Stop reason: HOSPADM

## 2020-06-29 RX ORDER — FENTANYL CITRATE 50 UG/ML
75 INJECTION, SOLUTION INTRAMUSCULAR; INTRAVENOUS ONCE
Status: DISCONTINUED | OUTPATIENT
Start: 2020-06-29 | End: 2020-06-29 | Stop reason: HOSPADM

## 2020-06-29 RX ADMIN — SODIUM CHLORIDE 1000 ML: 9 INJECTION, SOLUTION INTRAVENOUS at 12:47

## 2020-06-29 ASSESSMENT — ENCOUNTER SYMPTOMS
VOMITING: 0
DIARRHEA: 1
ABDOMINAL PAIN: 1
FEVER: 0
CHILLS: 0

## 2020-06-29 ASSESSMENT — MIFFLIN-ST. JEOR: SCORE: 1364.61

## 2020-06-29 NOTE — DISCHARGE INSTRUCTIONS
Ultrasound today shows gallbladder sludge, which in the setting of right upper abdominal pain that gets worse with eating fatty foods probably indicates that it is causing some blockage.  Try avoiding fatty foods, drink lots of water, and eat foods that are high in fiber.  You can use Tylenol as directed for pain.  Return to the ED if you develop worsening pain, fever, vomiting, or for any other concerns.

## 2020-06-29 NOTE — ED PROVIDER NOTES
"  History   Chief Complaint  Abdominal Pain      HPI   Cherri Ding is a 65 year old female who presents to the emergency department for evaluation of abdominal pain.  Patient has had intermittent issues with right upper quadrant abdominal pain in the past, but never for this long.  [place] over the last 2 days and has noticed increasing pain in the right upper quadrant.  The pain is constant.  She thinks that it is exacerbated with fatty foods, though cannot recall eating anything in particular that made it feel worse.  She denies fever, chills, vomiting, but states stools have been slightly more loose than in the past.  She has never been evaluated for gallbladder disease before.  She does have history of appendectomy.    Allergies  Aspirin  Penicillin G  Vicodin  Amoxicillin   Ibuprofen   Thimerosal     Medications  Lexparo   Levothyroxine     Past Medical History  Hypothyroidism   Depression   Arthritis     Past Surgical History  Appendectomy   [place] teeth extraction   Right shoulder surgery     Family History  No past pertinent family history.    Social History:  Smoking Status: Never Smoker  Smokeless Tobacco: Never Used  Alcohol Use: Yes  Drug Use: No  PCP: Elayne Emmanuel    Review of Systems   Constitutional: Negative for chills and fever.   Respiratory: Negative for cough, chest tightness and shortness of breath.    Gastrointestinal: Positive for abdominal pain and diarrhea. Negative for vomiting.   Genitourinary: Negative for dysuria and urgency.   All other systems reviewed and are negative.    Physical Exam     Patient Vitals for the past 24 hrs:   BP Temp Temp src Heart Rate Resp SpO2 Height Weight   06/29/20 1158 (!) 145/49 96.9  F (36.1  C) Temporal 55 16 99 % 1.778 m (5' 10\") 73.9 kg (163 lb)       Physical Exam  Gen: Pleasant, appears stated age.    Eye:   Pupils are equal, round, and reactive.     Sclera non-injected.    ENT:   Moist mucus membranes.     Normal tongue.    Oropharynx " without lesions.    Cardiac:     Normal rate and regular rhythm.    No murmurs, gallops, or rubs.    Pulmonary:     Clear to auscultation bilaterally.    No wheezes, rales, or rhonchi.    Abdomen:     Normal active bowel sounds.     Abdomen is soft, with voluntary guarding in the right upper and lower abdomen.  She is very tender to palpation over the right upper quadrant and right lower rib cage.  Positive Valdez sign.    Musculoskeletal:     Normal movement of all extremities without evidence for deficit.    Extremities:    No edema.    Skin:   Warm and dry.    Neurologic:    Non-focal exam without asymmetric weakness or numbness.    Normal tone    Psychiatric:     Normal affect with appropriate interaction with examiner.    Emergency Department Course     Imaging:  Radiology findings were communicated with the patient who voiced understanding of the findings.    US Abdomen, Limited RUQ:  1.  There is some amorphous material in the neck of the gallbladder   that could represent sludge. It does not have the typical appearance   of a polyp or stone. No echogenic shadowing stones seen. No   gallbladder wall thickening.   2.  Common duct is at the upper limits of normal measuring 6 mm.  as per radiology.     Laboratory:  Laboratory findings were communicated with the patient who voiced understanding of the findings.    Lipase: 157    CMP: Glucose 96, WNL (Creatinine: 0.74)    CBC: WBC: 6.0, HGB: 14.2, PLT: 234     Interventions:    1247 NS 1L IV       Emergency Department Course:  Past medical records, nursing notes, and vitals reviewed.    1207 I performed an exam of the patient as documented above.       1243 IV was inserted and blood was drawn for laboratory testing, results above.      3:28 PM   I rechecked the patient and discussed the results of the workup thus far.  I also discussed the patient's case with her daughter, who is a nurse at [employer].    Findings and plan explained to the Patient. Patient discharged  home with instructions regarding supportive care, medications, and reasons to return. The importance of close follow-up was reviewed.    I personally reviewed the laboratory and imaging results with the Patient and answered all related questions prior to discharge.     Impression & Plan       Medical Decision Making:  Cherri Ding is a 65 year old female who presents with right upper quadrant pain.  On exam, the patient is afebrile but with signs of inflammation in the right upper quadrant including a positive Valdez sign.  Laboratory evaluation is reassuring, ruling out hepatitis and pancreatitis.  She has had mild pain in the ED, and has not had any vomiting.  Right upper quadrant ultrasound demonstrates gallbladder sludge, but no signs of inflammation such as gallbladder wall thickening or pericholecystic fluid.  Given that symptoms seem to be exacerbated by fatty foods, and have been recurrent over the past several months, I suspect that the gallbladder sludge is causing some obstruction given location in the neck of the gallbladder.  I do not suspect another process such as pneumonia or pulmonary embolism.  We discussed observation and urgent cholecystectomy in the hospital versus discharge.  At this point, the patient and her daughter agree with plan for discharge, to continue symptomatic management, and arrange for outpatient surgical follow-up.  She will return to the ED for fever, vomiting, severe increasing pain, or for other concerns.    Diagnosis:    ICD-10-CM    1. Biliary colic  K80.50        Disposition:  Discharged to home.    Discharge Medications:  New Prescriptions    No medications on file       Scribe Disclosure:  I, Chanelle Barboza, am serving as a scribe at 12:08 PM on 6/29/2020 to document services personally performed by Fozia Smith MD based on my observations and the provider's statements to me.      Fozia Smith MD  06/30/20 [id number]

## 2020-06-29 NOTE — ED AVS SNAPSHOT
Emergency Department  [address] [place] [id number]  Phone:  [phone]  Fax:  [phone]                                    Cherri Ding   MRN: [id number]    Department:   Emergency Department   Date of Visit:  6/29/2020           After Visit Summary Signature Page    I have received my discharge instructions, and my questions have been answered. I have discussed any challenges I see with this plan with the nurse or doctor.    ..........................................................................................................................................  Patient/Patient Representative Signature      ..........................................................................................................................................  Patient Representative Print Name and Relationship to Patient    ..................................................               ................................................  Date                                   Time    ..........................................................................................................................................  Reviewed by Signature/Title    ...................................................              ..............................................  Date                                               Time          22EPIC Rev 08/18

## 2020-06-30 ASSESSMENT — ENCOUNTER SYMPTOMS
COUGH: 0
DYSURIA: 0
SHORTNESS OF BREATH: 0
CHEST TIGHTNESS: 0

## 2020-07-13 NOTE — PROGRESS NOTES
Patient:  Cherri Ding MRN:  [id number] :  55 MOORE:  20   Education: 18 Handedness:  Right  Provider: [facility] Psychometrist:  SAMANTHA   Station: Outpatient Age: 65       Orientation  WRAT-4  WAIS-IV Raw SS RDS   Personal Info 4 Raw 68 Digit Span 29 12 10   Place 1  Vocabulary 45 12    Time 0 %ile 93 Matrix Reasoning 15 11 Est VIQ   Presidents 2 Grade Equivalence >12.9 Similarities 31 14 116   BNT-15  COWAT    Animal Fluency    Raw 15 Form CFL   Raw 17   z 0.90 Raw 37   SS/z 9   Total Stim Correct 0 SS/MAS/z 10   T 42   Total Phonemic Correct 0 %ILE RANGE 41-59       Complex Ideational Material  Clock Drawing  RBANS Line Orientation     Raw 12 Command Normal Raw 17     SS 12   z 0.14     T 51   %ile 51-75         TSAT          Total time 132 Z -1.05       Total Errors 6 Z -1.48   HVLT           Trial 1 6   T-Score   T-Score   Trial 2 7 Total Recall 20 35 True Positives 7    Trial 3 7 Delayed Recall 3 <20 False Positives 3    Learning 1 Percent Retention 43 <20 Discrim. Index 4 <20   RBANS Story           Total Immediate 16 z Score -0.69 Scaled Score 9     Total Delay 3 z Score -3 Scaled Score 3     ILS Health and Safety Questionnaire         Total 37 Classification HIGH

## 2020-07-13 NOTE — PROGRESS NOTES
"Cherri Ding is a 65-year-old woman who is being evaluated via a billable video visit. Telemedicine services are necessary because of the COVID-19 pandemic.     The patient has been notified of following:   \"This video visit will be conducted via a call between you and your physician/provider. We have found that certain health care needs can be provided without the need for an in-person exam.  This service lets us provide the care you need with a video conversation.  Video visits are billed at different rates depending on your insurance coverage.  Please reach out to your insurance provider with any questions.  If during the course of the call the provider feels a video visit is not appropriate, you will not be charged for this service.\"     Patient has given verbal consent for Video visit? Yes   Patient would like the video invitation sent by: Send to e-mail at: [email or web address]  Will anyone else be joining your video visit? No     Video-Visit Details  Type of service:  Video Conference  Interview:     Video Start Time: 12:30 PM     Video End Time: 12:50 PM  Formal Testing:     Video Start Time: 1:00 PM     Video End Time: 3:42 PM  Originating Location (pt. Location): Home   Distant Location (provider location):  [facility] NEUROPSYCHOLOGY   Platform used for Video Visit: [facility]      NAME: Cherri Ding   MRN: [id number]   : 1955  MOORE: 2020  Neuropsychology Laboratory  [address]  [phone]    NEUROPSYCHOLOGICAL EVALUATION    RELEVANT HISTORY AND REASON FOR REFERRAL    This is a report of neuropsychological consultation regarding Cherri Ding, a 65-year-old, right-handed woman with 18 years of formal education. She presents with concerns about cognitive issues first noticed around  (i.e., circa age 60). Genetic testing reportedly indicated APOE allelic risk factors for dementia. She has an older brother reportedly diagnosed with " Alzheimer's disease who is just moving into a nursing facility today, at age 70. She tells me she has a twin brother, but she does not describe any cognitive concerns for him. Two aunts reportedly had probable dementia. She had previous neuropsychological evaluations through the [facility], with Dr. Jessica Galloway. The first evaluation was in July 2016 and the second was on August 2017. I do not have access to Dr. Galloway s reports or test data, but I have a second-hand synopsis from other records. At the first visit, there were mild impairments in memory and confrontation naming. Test scores apparently improved at the second evaluation in 2017, and she was seen as having an essentially normal neuropsychological profile except for dominant hand fine-motor slowing. At this time, she was referred to me by Dr. Tarik Farris, of the [facility]. Dr. Farris s notes describe normal-range cognitive screening (MoCA 28/30). Family members were reporting increases in anxiousness and mood lability, as well as more cognitive problems (misplacements, forgetfulness, missed bills) over the prior year. Broader neuropsychological reevaluation was requested, to aid in diagnosis and treatment planning.     Ms. Ding is unaccompanied during today's interview. She feels like she does have some more issues with her memory these days, and she acknowledges that her family members notice more problems than she does. She lives at home with her . She says she and her  share financial management of there been no changes in her share. She remains independent for medication management. She says there have been no changes to her ability to drive safely.    She denies any significant history of neurologic problems, such as stroke, seizure, TBI, migraine, motoric dyscontrol/parkinsonism, neurologic infections, episodes of altered mental status, or notable changes to her senses of smell, taste,  "hearing, or vision. MRI obtained during the 2016 workups at [facility] was apparently unrevealing.     Background medical concerns includes hypothyroidism. She denies any problems with maintaining her weight, maintaining her energy levels, or chronic pain. Today, she initially states that her only daily medications are levothyroxine and vitamins. Later on, she describes taking a medication for mood, but she cannot recall the name of it.    Dr. Farris's notes describe baseline emotional functioning with a high likelihood for chronic anxiety, perfectionistic traits, and a high need to stay busy. She has apparently been taking Lexapro for a decade or more. Today, she acknowledges taking a medication for mood, but she says she does not have any issues currently and say she has \"not really\" had any issues historically. She denies any psychiatric hospitalizations or concerns about suicidality. She tells me she has always had a short temper, but she denies any significant changes in her basic personality or temperament. She denies any hallucinatory experiences.    Dr. Farris's notes describe an alcohol habit of two glasses of wine daily. Today, she tells me has cut back to a single glass per night. She denies any history of alcohol problems. She denies the use of tobacco or illicit drugs.    She says that her sleep is good and  not abnormal in any way.  There are no indications of REM sleep behaviors. She has historically been a night owl, but she says she has shifted to earlier bedtimes lately.    There is no known history of early life medical complications or developmental delays. She denies any academic delays. She says she was always an A student. She denies any ADHD-like behavioral problems in childhood. She earned a bachelor's degree in design at the [employer]. She went on to earn a Master s degree in education at the [employer].    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 She says she has been off work secondary to the coronavirus pandemic for several months, but there is a plan for her to return to work this fall. She denies any cognitive issues interfering with her work duties. She had [place] second [place], but she stopped doing that earlier this year.    As noted above, reported family history includes a brother 5 years her senior with a diagnosis of Alzheimer's disease who just moved into a skilled nursing setting, and two aunts with likely dementia. She is not aware of any other cognitive disorders in the family. She is not aware of any other neurologic problems in family members. She is not aware of any psychiatric history in family members.    BEHAVIORAL OBSERVATIONS    Ms. Ding was polite and cooperative with the evaluation. She appeared to be open and candid in the interview. She demonstrated some memory lapses while providing her history, and she repeated several questions without seeming to notice over the course of our conversation. There were also several instances of thinking she that she had already told me several things that she had not yet mentioned. There were some indications of mildly perseverative thought patterns. There were a few instances of word-finding lapses, but her speech production was not overtly aphasic. Language comprehension appeared to be normal. She was alert and not somnolent. She seemed mildly inattentive and more on the careless/impulsive side in her approach to tasks. She appeared to put forth good effort throughout the testing. The test results may provide reasonable reflections of her cognitive status. However, the conditions of the assessment are not standard as the visit was conducted by videoconference instead of in person, which may render inaccurate any comparisons to standard normative data.    MEASURES ADMINISTERED    The following measures were  administered by a trained psychometrist, under my direct supervision:    Orientation: Time, Place, Basic Personal Information, Recent US Presidents; Wide Range Achievement Test 4: Word Reading; Wechsler Adult Intelligence Scales-IV: Vocabulary, Similarities, Matrix Reasoning, Digit Span; Controlled Oral Word Association Test; Animal Naming Test; Alma Naming Test (Short Form); Complex Ideational Material; Test of Sustained Attention & Tracking; Clock Drawing; Eubanks Verbal Learning Test-Revised; Repeatable Battery for the Assessment of Neuropsychological Status: Immediate and Delayed Stories, Judgment of Line Orientation; ILS Health and Safety Questionnaire; [place] Multiphasic Personality Djiewspev-2-RE.    RESULTS AND INTERPRETATION    Orientation to time was normal. Orientation to place was imprecise, stating her current location as [place] rather than the more specific [place] (she stated she was at home). Orientation was normal for basic personal information. Orientation to basic cultural information was low, being able to name just 2 of the last 6 [place] presidents and not in order (stated Trump as current & Bejarano as most recent, no guesses about other recent presidents). Performance on a reading and pronunciation test that is validated for estimating premorbid intelligence was in the high average to superior ranges (93rd %ile). Abstract verbal analogical reasoning was high average. Demonstrating vocabulary knowledge was on the higher side of average. Practical reasoning for a variety of health and safety scenarios was normal. Visual reasoning through pattern identification was average. Clock drawing was grossly normal. Visual perceptual matching and discrimination of variably oriented lines was average. Immediate auditory attention and working memory were on the higher side of average for repeating and rearranging digit strings. Performances were low average across a variety of brief verbal tasks  requiring executive management of attention and concentration. Confrontation naming was normal. Letter-based verbal fluency was middle average. Category-based verbal fluency was lower average. Oral comprehension and making inferences from sentences and brief paragraphs was intact. Immediate verbal memory for short stories was average, while delayed story recall was impaired. Learning a word list over repeated readings was borderline impaired, with almost no improvement across trials. Delayed free recall of the list was very impaired, and delayed recognition of the list was very impaired.     Her response set to a lengthy questionnaire for objective assessment of personality functioning and emotional coping patterns (MMPI-2-RF) was technically valid and interpretable. There were indications of mild tendencies for psychological defensiveness among the embedded validity indicators, but not to a degree that would clearly jeopardize profile validity. In this context, there were no significant elevations among the core clinical scales or the various supplemental scales. The profile suggested relatively good psychological adjustment.    IMPRESSIONS    In the context of nonstandard and limited assessment via video conference instead of in person (as necessitated by the current COVID-19 situation), the cognitive test results are abnormal. Her test scores indicate the presence of impairment in anterograde memory formation. She seems to have weaknesses in some aspects of orientation. Her estimated premorbid cognitive baseline is well above average, and several test performances rise to this level today. Her general intellectual abilities appear to be above average. There are variable weaknesses in some aspects of complex attentional control and speeded mental processing, but not at the level of an outright impairment. The primary problem in her neuropsychological profile today is impaired learning and memory.    The data  indicate cerebral dysfunction primarily among memory systems of the temporal midline and/or basal forebrain. The cognitive profile is concerning for burgeoning neurodegenerative syndromes, with Alzheimer s disease being most likely. Her family s reports of recent mood swings and increased anxiety could be consistent with behavioral changes early in a neurodegenerative syndrome. At this time, I do not see evidence for etiologically significant mental health concerns. 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 She is not in a state of dementia. She may have declined compared to baseline testing, but I do not have access to her prior data from the 2016 and 2017 evaluations at [facility], so I cannot speak directly to rates of change.     RECOMMENDATIONS    Continued neurologic care and monitoring are indicated. I would not presume Alzheimer s pathology as the cause until other treatable etiologies have been ruled out. Additionally, in light of prior testing that apparently showed fluctuating test performances over time, I would encourage a conservative diagnostic approach until today s abnormal test performances are replicated.     I defer to Dr. Farris regarding specific additional workups or any approaches to medications. In the interest of long-term brain health, she should be encouraged to maintain a lifestyle of regular physical, mental, and social activity, with a high-quality diet and good sleep habits.    An anterograde memory impairment is concerning for her plans to return to work. She may be able to manage highly routine tasks, but cognitive interference with work quality would be expected. Perhaps an additional point of view via an Occupational Therapy evaluation would be helpful.     Continued clinical attention to the family s concerns about anxiousness or  other behavioral changes is reasonable. Family members may wish to keep in mind that individuals with memory problems are prone to new or unexpected reactions in previously routine situations. The knowledge that she seems to be developing a memory disorder may help family members to adjust their expectations or demands in various situations.     Longitudinal monitoring is needed to replicate today s findings and solidify a diagnostic disposition. A return to this clinic in 9-12 months would be reasonable. I would always be happy to see her along a different timeline, as clinically indicated.     Timmy Sutherland, PhD, LP, ABPP-CN  Board Certified in Clinical Neuropsychology  Licensed Psychologist [id number]     Department of Rehabilitation Medicine  Division of Adult Neuropsychology  [facility]      Time spent: One hour neurobehavioral status exam including interview, clinical assessment by licensed and board-certified neuropsychologist (CPT 72268). One hour neuropsychological testing evaluation by licensed and board-certified neuropsychologist, including integration of patient data, interpretation of standardized test results and clinical data, clinical decision-making, treatment planning, report, and interactive feedback to the patient, first hour (CPT 86695). One hour of neuropsychological testing evaluation by licensed and board-certified neuropsychologist, including integration of patient data, interpretation of standardized test results and clinical data, clinical decision-making, treatment planning, report, and interactive feedback to the patient, subsequent hours (CPT 95611). 30 minutes of psychological and neuropsychological test administration and scoring by technician, first 30 minutes (CPT 66402). 149 minutes psychological or neuropsychological test administration and scoring by technician, subsequent 30-minute intervals (CPT 72128). Diagnoses: G31.84

## 2020-08-28 ENCOUNTER — APPOINTMENT (OUTPATIENT)
Dept: GENERAL RADIOLOGY | Facility: CLINIC | Age: 65
End: 2020-08-28
Attending: EMERGENCY MEDICINE
Payer: COMMERCIAL

## 2020-08-28 ENCOUNTER — HOSPITAL ENCOUNTER (EMERGENCY)
Facility: CLINIC | Age: 65
Discharge: HOME | End: 2020-08-28
Attending: EMERGENCY MEDICINE | Admitting: EMERGENCY MEDICINE
Payer: COMMERCIAL

## 2020-08-28 VITALS
HEIGHT: 70 IN | DIASTOLIC BLOOD PRESSURE: 47 MMHG | TEMPERATURE: 97.6 F | HEART RATE: 54 BPM | OXYGEN SATURATION: 100 % | WEIGHT: 163 LBS | SYSTOLIC BLOOD PRESSURE: 131 MMHG | BODY MASS INDEX: 23.34 KG/M2 | RESPIRATION RATE: 21 BRPM

## 2020-08-28 DIAGNOSIS — S92.155A CLOSED NONDISPLACED AVULSION FRACTURE OF LEFT TALUS, INITIAL ENCOUNTER: ICD-10-CM

## 2020-08-28 PROCEDURE — 73610 X-RAY EXAM OF ANKLE: CPT | Mod: LT

## 2020-08-28 PROCEDURE — 72100 X-RAY EXAM L-S SPINE 2/3 VWS: CPT

## 2020-08-28 PROCEDURE — 25000132 ZZH RX MED GY IP 250 OP 250 PS 637: Performed by: EMERGENCY MEDICINE

## 2020-08-28 PROCEDURE — 73630 X-RAY EXAM OF FOOT: CPT | Mod: LT

## 2020-08-28 PROCEDURE — 28400 CLTX CALCANEAL FX W/O MNPJ: CPT | Mod: LT

## 2020-08-28 PROCEDURE — 28430 CLTX TALUS FRACTURE W/O MNPJ: CPT | Mod: LT

## 2020-08-28 PROCEDURE — 99285 EMERGENCY DEPT VISIT HI MDM: CPT | Mod: 25

## 2020-08-28 PROCEDURE — 73502 X-RAY EXAM HIP UNI 2-3 VIEWS: CPT

## 2020-08-28 RX ORDER — ACETAMINOPHEN 325 MG/1
650 TABLET ORAL ONCE
Status: COMPLETED | OUTPATIENT
Start: 2020-08-28 | End: 2020-08-28

## 2020-08-28 RX ADMIN — ACETAMINOPHEN 650 MG: 325 TABLET, FILM COATED ORAL at 06:18

## 2020-08-28 ASSESSMENT — ENCOUNTER SYMPTOMS
BACK PAIN: 1
HEADACHES: 0
NECK PAIN: 0
ABDOMINAL PAIN: 0

## 2020-08-28 ASSESSMENT — MIFFLIN-ST. JEOR: SCORE: 1364.61

## 2020-08-28 NOTE — ED TRIAGE NOTES
Patient slipped and fell down a few stairs at home this morning. Did not hit head and denies LOC. Patient reports left ankle pain and lower back pain.

## 2020-08-28 NOTE — ED PROVIDER NOTES
"  History     Chief Complaint:  Lower Right Back Pain; and Left Lateral Ankle Pain    HPI   Cherri Ding is a 65 year old female who presents with lower right back pain and lateral left ankle pain after a fall. The patient reports that she fell down the last two steps of her staircase and is now experiencing lower right back pain and lateral left ankle pain. She states that she can bear some weight on her ankle but it is very uncomfortable. She did not hit her head or experience loss of consciousness. She denies neck pain, headache, wrist pain, arm pain, or abdominal pain. She has not taken anything for pain.     Allergies:  Aspirin  Ibuprofen  Penicillin G  Thimerosal  Vicodin [Hydrocodone-Acetaminophen]    Medications:    Lexapro  Levothroid  Aricept  Namenda  Synthroid    Past Medical History:    Thyroid disease  Somatic dysfunction of pelvis region  Arthritis of hip  Hypothyroidism    Past Surgical History:    Appendectomy  [place] teeth removal   Right shoulder surgery    Family History:    Brother- Alzheimer's disease  Sister- GI disease    Social History:  Smoking status: Never  Alcohol use: Yes, 5 glasses of wine a week  Drug use: No  PCP: EDINSON GONZALEZ  Presents to the ED with her   Marital Status:   [2]    Review of Systems   Gastrointestinal: Negative for abdominal pain.   Musculoskeletal: Positive for back pain. Negative for neck pain.        Left ankle pain   Neurological: Negative for syncope and headaches.   All other systems reviewed and are negative.      Physical Exam     Patient Vitals for the past 24 hrs:   BP Temp Temp src Pulse Resp SpO2 Height Weight   08/28/20 0603 131/47 -- -- -- -- -- -- --   08/28/20 0602 -- 97.6  F (36.4  C) Temporal 54 21 100 % 1.778 m (5' 10\") 73.9 kg (163 lb)      Physical Exam  Physical Exam   General:  Sitting on bed with  at bedside. Pt in no significant distress.  Talkative.   HENT:  No obvious trauma to head. Negative daley's sign and negative " raccoon eyes bilaterally.  Right Ear:  External ear normal.   Left Ear:  External ear normal.   Nose:  Nose normal. No epistaxis.  Eyes:  Conjunctivae and EOM are normal. Pupils are equal, round, and reactive.   Neck: Normal range of motion. Neck supple. No tracheal deviation present. No midline cervical neck tenderness, deformity, step off or pain in the midline with ROM.  CV:  Normal heart sounds. No murmur heard.  Pulm/Chest: Effort normal and breath sounds normal.   Abd: Soft. No distension. There is no tenderness. There is no rigidity, no rebound and no guarding.   M/S: Normal range of motion. No pain to palpation or deformity of all 4 extremities, other than pain over the distal left fibula and left dorsal mid foot, the latter with mild contusion. Mild pain to left hip. Pelvis stable to compression. No pain to palpation of step off to thoracic and lumbar spine, other than mild pain to the right lumbar perispinal area.  Neuro: Alert. CN II-XII Grossly intact. GCS 15.  Skin: Skin is warm and dry. No rash noted. Not diaphoretic.   Psych: Normal mood and affect. Behavior is normal.     Emergency Department Course     Imaging:  Radiographic findings were communicated with the patient who voiced understanding of the findings.  Left Pelvis XR, per radiology:   No acute fracture or malalignment. Moderate right and mild   left hip joint degenerative changes. Mild degenerative changes of the   sacroiliac joints and lower lumbar spine. Moderate degenerative   changes of the pubic symphysis.     Left Ankle XR, per radiology:    Small linear radiodensity adjacent to the dorsal talar   head is compatible with an age-indeterminate avulsion fracture. There   is normal joint spacing and alignment. The ankle mortise is congruent.   The talar dome is unremarkable.     Left Foot XR, per radiology:   Small radiodensities adjacent to the dorsal talar head and   lateral calcaneus distally are compatible with age-indeterminate    avulsion fractures. There is normal joint spacing and alignment.     Lumbar Spine XR, per radiology:   No fracture is identified. Severe degenerative disc   disease is present at L1-L2 and L2-L3. Alignment is significant for   slight levoconvex curvature and trace grade 1 retrolisthesis of L1 on   L2, L2 on L3, and L3 on L4. Mild lower lumbar spine facet arthropathy   is present. Paraspinal soft tissues are unremarkable.     Interventions:  0618: Tylenol 650 mg PO     Emergency Department Course:  Past medical records, nursing notes, and vitals reviewed.  0610: I performed an exam of the patient and obtained history, as documented above.     0620: The patient was sent for a lumbar spine x-ray and pelvis x-ray while in the emergency department, findings above.     0621: The patient was sent for a foot x-ray and ankle x-ray while in the emergency department, findings above.     0732 Findings and plan explained to the Patient. Patient discharged home with instructions regarding supportive care, medications, and reasons to return. The importance of close follow-up was reviewed.      0735: The patient was placed in a boot before leaving the ED. the patient was able to get up and ambulate with the walking boot with minimal pain.    Impression & Plan      Medical Decision Making:  Cherri Ding is a very pleasant 65 year old female who presents for evaluation of left foot pain after a fall. CMS is intact distally in the extremity.  X-rays reveal a talar avulsion fracture that does not need reduction at this time.  She has acute pain, and contusion to this area, therefore this is acute and not old.  The patient does not recall any prior injury here either.  They understand that this need for reduction and/or surgery may change with time and orthopedic consultation. There is no indication for ortho consultation from the ED. Rather, close follow-up is indicated in the next days.  Walking boot and fracture precautions for  home.  I reviewed the incidental findings on the other films including degenerative changes.  The patients head to toe trauma exam is otherwise normal other than above at this time and no further trauma workup is needed as I believe there is no signs of serious head, neck, chest, spinal, extremity or abdominal injuries.     The treatment plan was discussed with the patient and they expressed understanding of this plan and consented to the plan.  In addition, the patient will return to the emergency department if their symptoms persist, worsen, if new symptoms arise or if there is any concern as other pathology may be present that is not evident at this time. They also understand the importance of close follow up in the clinic and if unable to do so will return to the emergency department for a reevaluation. All questions were answered.    Diagnosis:    ICD-10-CM   1. Closed nondisplaced avulsion fracture of left talus, initial encounter  S92.155A       Disposition:  Discharged to home.      Jennie Herrera  8/28/2020    EMERGENCY DEPARTMENT    Jennie ORELLANA am serving as a scribe at 6:12 AM on 8/28/2020 to document services personally performed by Austin Olmstead DO based on my observations and the provider's statements to me.         Austin Olmstead DO  08/28/20 0846

## 2020-08-28 NOTE — ED AVS SNAPSHOT
Emergency Department  [address] [place] [id number]  Phone:  [phone]  Fax:  [phone]                                    Cherri Ding   MRN: [id number]    Department:   Emergency Department   Date of Visit:  8/28/2020           After Visit Summary Signature Page    I have received my discharge instructions, and my questions have been answered. I have discussed any challenges I see with this plan with the nurse or doctor.    ..........................................................................................................................................  Patient/Patient Representative Signature      ..........................................................................................................................................  Patient Representative Print Name and Relationship to Patient    ..................................................               ................................................  Date                                   Time    ..........................................................................................................................................  Reviewed by Signature/Title    ...................................................              ..............................................  Date                                               Time          22EPIC Rev 08/18

## 2020-08-30 ENCOUNTER — NURSE TRIAGE (OUTPATIENT)
Dept: NURSING | Facility: CLINIC | Age: 65
End: 2020-08-30

## 2020-08-30 NOTE — TELEPHONE ENCOUNTER
Caller states she has a fractured talus of the left foot. Caller states the left first 3 toes are bluish in color and she was having some tingling to those toes. Caller states she removed the boot several minutes ago, and now bluishness is starting to improve. Caller denies any numbness or tingling currently, and is able to wiggle toes. Caller denies any pain. Triage guidelines recommend to call back within an hour for follow up. Caller verbalized and understands directives.  COVID 19 Nurse Triage Plan/Patient Instructions    Please be aware that novel coronavirus (COVID-19) may be circulating in the community. If you develop symptoms such as fever, cough, or SOB or if you have concerns about the presence of another infection including coronavirus (COVID-19), please contact your health care provider or visit [email or web address].     Disposition/Instructions    Home care recommended. Follow home care protocol based instructions.    Thank you for taking steps to prevent the spread of this virus.  o Limit your contact with others.  o Wear a simple mask to cover your cough.  o Wash your hands well and often.    Resources    [facility]: About COVID-19: [email or web address]    CDC: What to Do If You're Sick: www.cdc.gov/coronavirus/2019-ncov/about/steps-when-sick.html    CDC: Ending Home Isolation: www.cdc.gov/coronavirus/2019-ncov/hcp/disposition-in-home-patients.html     CDC: Caring for Someone: www.cdc.gov/coronavirus/2019-ncov/if-you-are-sick/care-for-someone.html     [facility]: Interim Guidance for Hospital Discharge to Home: www.health.[facility].[place]/diseases/coronavirus/hcp/hospdischarge.pdf    [facility] clinical trials (COVID-19 research studies): clinicalaffairs.[facility]/um-clinical-trials     Below are the COVID-19 hotlines at the [place] [facility] ([facility]). Interpreters are available.   o For health questions: Call 193-900-9616 or 1-781.158.7245 (7 a.m. to 7 p.m.)  o For questions  about schools and childcare: Call 714-191-3001 or 1-440.221.4943 (7 a.m. to 7 p.m.)                     Additional Information    Splint or elastic bandage problems or questions    Negative: Cast problems or questions    Negative: Chest pain    Negative: Difficulty breathing    Negative: [1] SEVERE pain AND [2] not improved one hour after pain medicine, elevation, and loosening elastic bandage    Negative: [1] Increasing pain under splint AND [2] splint put on > 72 hours ago    Negative: Patient sounds very sick or weak to the triager    Negative: [1] Numbness (loss of sensation) of fingers or toes AND [2] persists > 1 hour after loosening elastic bandage    Negative: [1] Tingling (pins and needles sensation) of fingers or toes AND [2] persists > 1 hour after loosening elastic bandage    Negative: [1] Blueness or pallor of fingers or toes (compared to noninjured side) AND [2] persists > 1 hour after loosening elastic bandage    Negative: [1] Caller has URGENT question AND [2] triager unable to answer question    Negative: Splint breaks or cracks    Negative: Edge of splint is causing a sore    Negative: [1] Plaster splint gets wet AND [2] is soft after attempted drying    Negative: [1] Splint lining gets wet AND [2] pins present and touching wet area    Negative: [1] Caller has NON-URGENT question AND [2] triager unable to answer question    Negative: [1] Fingers or toes are swollen (compared to noninjured side) AND [2] persists > 24 hours after loosening elastic bandage    Negative: Splint removal date, questions about    Negative: Pain from fractures or sprains    Symptoms from tight splint    Negative: Symptoms from tight elastic bandage without a splint (e.g., sprained ankle or knee)    Negative: Normal splint care, questions about    Negative: Wet splint, questions about    Negative: Sharp edge on splint, questions about    Negative: Normal ankle air cast (air stirrup), questions about    Protocols used: CAST  SYMPTOMS AND [id number], SPLINT SYMPTOMS AND [id number]-FA

## (undated) DEVICE — ENDO SNARE POLYPECTOMY OVAL 15MM LOOP SD-240U-15

## (undated) DEVICE — KIT ENDO TURNOVER/PROCEDURE W/CLEAN A SCOPE LINERS 103888

## (undated) DEVICE — ENDO TRAP POLYP QUICK CATCH 710201

## (undated) RX ORDER — FENTANYL CITRATE 50 UG/ML
INJECTION, SOLUTION INTRAMUSCULAR; INTRAVENOUS
Status: DISPENSED
Start: 2017-11-27